# Patient Record
Sex: MALE | Race: WHITE
[De-identification: names, ages, dates, MRNs, and addresses within clinical notes are randomized per-mention and may not be internally consistent; named-entity substitution may affect disease eponyms.]

---

## 2020-09-08 ENCOUNTER — HOSPITAL ENCOUNTER (OUTPATIENT)
Dept: HOSPITAL 52 - LL.ED | Age: 45
Setting detail: OBSERVATION
Discharge: NURSING, REHAB OR LONG-TERM CARE | End: 2020-09-08
Attending: EMERGENCY MEDICINE | Admitting: EMERGENCY MEDICINE
Payer: COMMERCIAL

## 2020-09-08 VITALS — DIASTOLIC BLOOD PRESSURE: 89 MMHG | HEART RATE: 77 BPM | SYSTOLIC BLOOD PRESSURE: 133 MMHG

## 2020-09-08 DIAGNOSIS — F17.210: ICD-10-CM

## 2020-09-08 DIAGNOSIS — F41.9: ICD-10-CM

## 2020-09-08 DIAGNOSIS — K21.9: ICD-10-CM

## 2020-09-08 DIAGNOSIS — R78.0: ICD-10-CM

## 2020-09-08 DIAGNOSIS — R07.2: Primary | ICD-10-CM

## 2020-09-08 DIAGNOSIS — Z79.899: ICD-10-CM

## 2020-09-08 LAB
CHLORIDE SERPL-SCNC: 102 MMOL/L (ref 98–107)
SODIUM SERPL-SCNC: 139 MMOL/L (ref 136–145)

## 2020-09-08 PROCEDURE — C9113 INJ PANTOPRAZOLE SODIUM, VIA: HCPCS

## 2020-09-08 PROCEDURE — G0378 HOSPITAL OBSERVATION PER HR: HCPCS

## 2020-09-08 RX ADMIN — Medication PRN ML: at 14:37

## 2020-09-08 RX ADMIN — Medication PRN ML: at 14:41

## 2020-09-08 RX ADMIN — Medication PRN ML: at 16:41

## 2020-09-08 NOTE — [id number]
**Discharge Summary





- Hospital Course


Brief History: Admitted for observation and serial troponins/rule-out MI due to 

left sided chest pain.


Diagnosis: Stroke: No





- Discharge Data


Discharge Date: 09/08/20


Discharge Disposition: DC/Tfer to Acute Hospital 02


Condition: Good





- Referral to Home Health


Primary Care Physician: 


Shannan Terry PA-C








- Discharge Diagnosis/Problem(s)


(1) Chest pain


SNOMED Code(s): 26895722


   ICD Code: R07.9 - CHEST PAIN, UNSPECIFIED   Status: Acute   Priority: High   

Current Visit: Yes   Problem Details: Negative Troponins in ER and 1900 .  No 

acute changes on EKG in ER.  1900 EKG showed new nonspecific changes that were 

subsequently reviewed with  from [facility].  He also felt 

that changes were nonspecific but offered to take the patient for further 

evaluation with plans for stress test tomorrow.  Differential includes 

gastritis/GI etiology from ETOH use over the weekend and yesterday.    


Qualifiers: 


   Chest pain type: unspecified   Qualified Code(s): R07.9 - Chest pain, 

unspecified   





(2) GERD (gastroesophageal reflux disease)


SNOMED Code(s): 044998462


   ICD Code: K21.9 - GASTRO-ESOPHAGEAL REFLUX DISEASE WITHOUT ESOPHAGITIS   

Status: Chronic   Priority: High   Current Visit: Yes   Problem Details: History

of heartburn/GERD.  Takes Prevacid at home.  Received IV Protonix and Pepcid in 

ER.  Suspect heavy ETOH over holiday weekend may have triggered exacerbation. 

H.Pylori and stool for occult blood ordered but patient did not provide sample 

prior to transfer.    


Qualifiers: 


   Esophagitis presence: esophagitis presence not specified   Qualified Code(s):

K21.9 - Gastro-esophageal reflux disease without esophagitis   





(3) Elevated ETOH level


SNOMED Code(s): 687417198


   ICD Code: R78.0 - FINDING OF ALCOHOL IN BLOOD   Status: Acute   Priority: 

Medium   Current Visit: Yes   Problem Details: 0.07 ETOH level today in ER.  

Patient admits to drinking beer daily. LFTs normal. Encouraged to cut down and 

educated on effects of ETOH use on stomach/esophagus. Patient denies 

DTs/withdrawal symptoms when he does not drink.    


Qualifiers: 


   Blood alcohol level: level not specified   Qualified Code(s): R78.0 - Finding

of alcohol in blood   





(4) Anxiety


SNOMED Code(s): 75340404


   ICD Code: F41.9 - ANXIETY DISORDER, UNSPECIFIED   Status: Chronic   Priority:

Medium   Current Visit: Yes   Problem Details: Currently not under medical 

therapy.    





- Patient Summary/Data


Hospital Course: 





Patient continued to note pain present left lower anterior chest.  Improved with

the GI cocktail in ER.  Gradually came back.  Also noted continued intermittent 

stabbing feeling to the patient. Pressing on chest wall sometimes reproduced p

ain complaint.  Toradol did not appear to be helpful.  Patient did not want 

additional IV meds for the pain/Nitro but eventually was ok with having another 

GI cocktail. Second GI cocktail also improved pain complaint. PRN MS ordered.  

Patient refused additional IV fluids. 


Troponin at 1900  negative.  EKG showed nonspecific changes throughout.  Call 

placed to [facility] and patient discussed with .  He also 

felt that EKG changes were nonspecific.  He did offer to have patient 

transferred to their facility to complete the rule out and have stress testing 

performed tomorrow morning for a more complete rule out.  This was offered to 

the patient.  Patient and wife elected for transfer to [facility]. Patient 

officially accepted by , Hospitalist.  Patient to be transferred by 

EMS once bed confirmed to be available. 


Vital signs stable throughout stay.


Telemetry unremarkable overall. 





- Discharge Plan


Home Medications: 


                                    Home Meds





Fluticasone Propionate 1 sprays NASBOTH BEDTIME 09/08/20 [History]


Lansoprazole [Prevacid] 30 mg PO BID@1200,2000 09/08/20 [History]


Montelukast Sodium [Singulair] 10 mg PO BEDTIME 09/08/20 [History]








Forms:  ED Department Discharge


Referrals: 


Shannan Terry PA-C [Primary Care Provider] - 





- Discharge Summary/Plan Comment


DC Time >30 min.: Yes (Waiting for bed confirmation from [facility])





- Patient Data


Vitals - Most Recent: 


                                Last Vital Signs











Temp  37.7 C   09/08/20 20:00


 


Pulse  77   09/08/20 20:00


 


Resp  16   09/08/20 20:00


 


BP  133/89   09/08/20 20:00


 


Pulse Ox  97   09/08/20 20:00











Weight - Most Recent: 107.91 kg


Lab Results - Last 24 hrs: 


                         Laboratory Results - last 24 hr











  09/08/20 09/08/20 09/08/20 Range/Units





  14:25 14:25 14:25 


 


WBC  11.7 H    (4.0-10.2)  K/uL


 


RBC  5.75 H    (4.33-5.41)  M/uL


 


Hgb  17.8 H    (13.1-16.8)  g/dL


 


Hct  51.6 H    (39.0-49.0)  %


 


MCV  89.7    (84.0-98.0)  fL


 


MCH  31.0    (28.2-33.3)  pg


 


MCHC  34.5    (31.7-36.0)  g/dL


 


RDW  14.1    (11.2-14.1)  %


 


Plt Count  225    (150-350)  K/uL


 


Neut % (Auto)  75.9    (45.0-80.0)  %


 


Lymph % (Auto)  14.2    (10.0-50.0)  %


 


Mono % (Auto)  8.6    (2.0-14.0)  %


 


Eos % (Auto)  1.0    (0.0-5.0)  %


 


Baso % (Auto)  0.3    (0.0-2.0)  %


 


Neut # (Auto)  8.84 H    (1.40-7.00)  K/uL


 


Lymph # (Auto)  1.66    (0.50-3.50)  K/uL


 


Mono # (Auto)  1.00    (0.00-1.00)  K/uL


 


Eos # (Auto)  0.12    (0.00-0.50)  K/uL


 


Baso # (Auto)  0.04    (0.00-0.20)  K/uL


 


D-Dimer, Quantitative   183   (0-400)  ng/mL


 


Sodium    139  (136-145)  mmol/L


 


Potassium    3.8  (3.5-5.1)  mmol/L


 


Chloride    102  ()  mmol/L


 


Carbon Dioxide    23.8  (21.0-32.0)  mmol/L


 


BUN    11  (7-18)  mg/dL


 


Creatinine    1.01  (0.51-1.17)  mg/dL


 


Est Cr Clr Drug Dosing    TNP  


 


Estimated GFR (MDRD)    > 60  mL/min


 


Glucose    93  ()  mg/dL


 


Lactic Acid     (0.4-2.0)  mmol/L


 


Calcium    8.6  (8.5-10.1)  mg/dL


 


Magnesium    2.0  (1.8-2.4)  mg/dL


 


Total Bilirubin    0.5  (0.2-1.0)  mg/dL


 


AST    18  (15-37)  U/L


 


ALT    27  (12-78)  U/L


 


Alkaline Phosphatase    49  ()  IU/L


 


Troponin I    0.000  (0.000-0.056)  ng/mL


 


NT-Pro-B Natriuret Pep    21  (0-125)  pg/mL


 


Total Protein    8.1  (6.4-8.2)  g/dL


 


Albumin    4.4  (3.4-5.0)  g/dL


 


Amylase     ()  U/L


 


Lipase     ()  U/L


 


Specimen Type     


 


Urine Color     


 


Urine Appearance     


 


Urine pH     (5.0-9.0)  


 


Ur Specific Gravity     (1.005-1.030)  


 


Urine Protein     (NEGATIVE)  mg/dL


 


Urine Glucose (UA)     (NEGATIVE)  mg/dL


 


Urine Ketones     (NEGATIVE)  mg/dL


 


Urine Occult Blood     (NEGATIVE)  


 


Urine Nitrite     (NEGATIVE)  


 


Urine Bilirubin     (NEGATIVE)  


 


Urine Urobilinogen     (0.2-1.0)  E.U./dL


 


Ur Leukocyte Esterase     (NEGATIVE)  


 


U Hyaline Cast (Auto)     


 


Urine RBC     /HPF


 


Urine WBC     /HPF


 


Ur Epithelial Cells     /LPF


 


Urine Bacteria     (NONE TO FEW)  /HPF


 


Urine Mucus     (NEGATIVE)  /LPF


 


Ethyl Alcohol    0.071  (0.000-0.080)  g/dL














  09/08/20 09/08/20 09/08/20 Range/Units





  14:25 14:25 17:40 


 


WBC     (4.0-10.2)  K/uL


 


RBC     (4.33-5.41)  M/uL


 


Hgb     (13.1-16.8)  g/dL


 


Hct     (39.0-49.0)  %


 


MCV     (84.0-98.0)  fL


 


MCH     (28.2-33.3)  pg


 


MCHC     (31.7-36.0)  g/dL


 


RDW     (11.2-14.1)  %


 


Plt Count     (150-350)  K/uL


 


Neut % (Auto)     (45.0-80.0)  %


 


Lymph % (Auto)     (10.0-50.0)  %


 


Mono % (Auto)     (2.0-14.0)  %


 


Eos % (Auto)     (0.0-5.0)  %


 


Baso % (Auto)     (0.0-2.0)  %


 


Neut # (Auto)     (1.40-7.00)  K/uL


 


Lymph # (Auto)     (0.50-3.50)  K/uL


 


Mono # (Auto)     (0.00-1.00)  K/uL


 


Eos # (Auto)     (0.00-0.50)  K/uL


 


Baso # (Auto)     (0.00-0.20)  K/uL


 


D-Dimer, Quantitative     (0-400)  ng/mL


 


Sodium     (136-145)  mmol/L


 


Potassium     (3.5-5.1)  mmol/L


 


Chloride     ()  mmol/L


 


Carbon Dioxide     (21.0-32.0)  mmol/L


 


BUN     (7-18)  mg/dL


 


Creatinine     (0.51-1.17)  mg/dL


 


Est Cr Clr Drug Dosing     


 


Estimated GFR (MDRD)     mL/min


 


Glucose     ()  mg/dL


 


Lactic Acid  1.6    (0.4-2.0)  mmol/L


 


Calcium     (8.5-10.1)  mg/dL


 


Magnesium     (1.8-2.4)  mg/dL


 


Total Bilirubin     (0.2-1.0)  mg/dL


 


AST     (15-37)  U/L


 


ALT     (12-78)  U/L


 


Alkaline Phosphatase     ()  IU/L


 


Troponin I     (0.000-0.056)  ng/mL


 


NT-Pro-B Natriuret Pep     (0-125)  pg/mL


 


Total Protein     (6.4-8.2)  g/dL


 


Albumin     (3.4-5.0)  g/dL


 


Amylase   90   ()  U/L


 


Lipase   175   ()  U/L


 


Specimen Type    Urinblad  


 


Urine Color    Dark yellow  


 


Urine Appearance    Clear  


 


Urine pH    5.5  (5.0-9.0)  


 


Ur Specific Gravity    1.025  (1.005-1.030)  


 


Urine Protein    Negative  (NEGATIVE)  mg/dL


 


Urine Glucose (UA)    Negative  (NEGATIVE)  mg/dL


 


Urine Ketones    Trace H  (NEGATIVE)  mg/dL


 


Urine Occult Blood    Negative  (NEGATIVE)  


 


Urine Nitrite    Negative  (NEGATIVE)  


 


Urine Bilirubin    Negative  (NEGATIVE)  


 


Urine Urobilinogen    0.2  (0.2-1.0)  E.U./dL


 


Ur Leukocyte Esterase    Negative  (NEGATIVE)  


 


U Hyaline Cast (Auto)    Few  


 


Urine RBC    0-5  /HPF


 


Urine WBC    0-5  /HPF


 


Ur Epithelial Cells    Few  /LPF


 


Urine Bacteria    Rare  (NONE TO FEW)  /HPF


 


Urine Mucus    Moderate H  (NEGATIVE)  /LPF


 


Ethyl Alcohol     (0.000-0.080)  g/dL














  09/08/20 Range/Units





  19:05 


 


WBC   (4.0-10.2)  K/uL


 


RBC   (4.33-5.41)  M/uL


 


Hgb   (13.1-16.8)  g/dL


 


Hct   (39.0-49.0)  %


 


MCV   (84.0-98.0)  fL


 


MCH   (28.2-33.3)  pg


 


MCHC   (31.7-36.0)  g/dL


 


RDW   (11.2-14.1)  %


 


Plt Count   (150-350)  K/uL


 


Neut % (Auto)   (45.0-80.0)  %


 


Lymph % (Auto)   (10.0-50.0)  %


 


Mono % (Auto)   (2.0-14.0)  %


 


Eos % (Auto)   (0.0-5.0)  %


 


Baso % (Auto)   (0.0-2.0)  %


 


Neut # (Auto)   (1.40-7.00)  K/uL


 


Lymph # (Auto)   (0.50-3.50)  K/uL


 


Mono # (Auto)   (0.00-1.00)  K/uL


 


Eos # (Auto)   (0.00-0.50)  K/uL


 


Baso # (Auto)   (0.00-0.20)  K/uL


 


D-Dimer, Quantitative   (0-400)  ng/mL


 


Sodium   (136-145)  mmol/L


 


Potassium   (3.5-5.1)  mmol/L


 


Chloride   ()  mmol/L


 


Carbon Dioxide   (21.0-32.0)  mmol/L


 


BUN   (7-18)  mg/dL


 


Creatinine   (0.51-1.17)  mg/dL


 


Est Cr Clr Drug Dosing   


 


Estimated GFR (MDRD)   mL/min


 


Glucose   ()  mg/dL


 


Lactic Acid   (0.4-2.0)  mmol/L


 


Calcium   (8.5-10.1)  mg/dL


 


Magnesium   (1.8-2.4)  mg/dL


 


Total Bilirubin   (0.2-1.0)  mg/dL


 


AST   (15-37)  U/L


 


ALT   (12-78)  U/L


 


Alkaline Phosphatase   ()  IU/L


 


Troponin I  0.000  (0.000-0.056)  ng/mL


 


NT-Pro-B Natriuret Pep   (0-125)  pg/mL


 


Total Protein   (6.4-8.2)  g/dL


 


Albumin   (3.4-5.0)  g/dL


 


Amylase   ()  U/L


 


Lipase   ()  U/L


 


Specimen Type   


 


Urine Color   


 


Urine Appearance   


 


Urine pH   (5.0-9.0)  


 


Ur Specific Gravity   (1.005-1.030)  


 


Urine Protein   (NEGATIVE)  mg/dL


 


Urine Glucose (UA)   (NEGATIVE)  mg/dL


 


Urine Ketones   (NEGATIVE)  mg/dL


 


Urine Occult Blood   (NEGATIVE)  


 


Urine Nitrite   (NEGATIVE)  


 


Urine Bilirubin   (NEGATIVE)  


 


Urine Urobilinogen   (0.2-1.0)  E.U./dL


 


Ur Leukocyte Esterase   (NEGATIVE)  


 


U Hyaline Cast (Auto)   


 


Urine RBC   /HPF


 


Urine WBC   /HPF


 


Ur Epithelial Cells   /LPF


 


Urine Bacteria   (NONE TO FEW)  /HPF


 


Urine Mucus   (NEGATIVE)  /LPF


 


Ethyl Alcohol   (0.000-0.080)  g/dL











Med Orders - Current: 


                               Current Medications





Acetaminophen (Tylenol)  650 mg PO Q4H PRN


   PRN Reason: Pain (Mild 1-3)/fever


Diazepam (Valium.)  5 mg PO ONETIME ONE


   Stop: 09/08/20 22:01


Diphenhydramine HCl (Benadryl)  50 mg IVPUSH ONETIME ONE


   Stop: 09/08/20 22:01


Famotidine (Pepcid)  20 mg IVPUSH ONETIME ONE


   Stop: 09/09/20 07:01


Fluticasone Propionate (Flonase)  0 gm NASBOTH BEDTIME BURT


   Last Admin: 09/08/20 19:21 Dose:  1 applic


   Documented by: 


Ketorolac Tromethamine (Toradol)  30 mg IVPUSH Q6H PRN


   PRN Reason: Pain (moderate 4-6)


   Last Admin: 09/08/20 16:39 Dose:  30 mg


   Documented by: 


Morphine Sulfate (Morphine)  2 mg IVPUSH Q2H PRN


   PRN Reason: Pain


Ondansetron HCl (Zofran)  4 mg IVPUSH Q4H PRN


   PRN Reason: Nausea/Vomiting


Pantoprazole Sodium (Protonix Iv***)  40 mg IVPUSH ONETIME ONE


   Stop: 09/09/20 07:01


Sodium Chloride (Saline Flush)  10 ml FLUSH ASDIRECTED PRN


   PRN Reason: Keep Vein Open


   Last Admin: 09/08/20 16:41 Dose:  10 ml


   Documented by: 





Discontinued Medications





Al Hydroxide/Mg Hydroxide (Gi Cocktail)  30 ml PO ONETIME ONE


   Stop: 09/08/20 14:29


   Last Admin: 09/08/20 14:35 Dose:  30 ml


   Documented by: 


Al Hydroxide/Mg Hydroxide (Gi Cocktail)  30 ml PO ONETIME ONE


   Stop: 09/08/20 18:41


Famotidine (Pepcid)  20 mg IVPUSH ONETIME ONE


   Stop: 09/08/20 15:29


   Last Admin: 09/08/20 16:39 Dose:  20 mg


   Documented by: 


Sodium Chloride (Normal Saline)  1,000 mls @ 999 mls/hr IV .BOLUS ONE


   Stop: 09/08/20 15:48


   Last Admin: 09/08/20 14:49 Dose:  999 mls/hr


   Documented by: 


Lorazepam (Ativan)  0.5 mg IVPUSH ONETIME ONE


   Stop: 09/08/20 14:29


   Last Admin: 09/08/20 14:35 Dose:  0.5 mg


   Documented by: 


Morphine Sulfate (Morphine)  2 mg IVPUSH Q1H PRN


   PRN Reason: Pain


Nicotine (Habitrol)  21 mg TRDERM ONETIME ONE


   Stop: 09/08/20 17:33


   Last Admin: 09/08/20 18:33 Dose:  Not Given


   Documented by: 


Nicotine (Habitrol)  21 mg TRDERM ONETIME ONE


   Stop: 09/08/20 19:01


   Last Admin: 09/08/20 19:20 Dose:  21 mg


   Documented by: 


Ondansetron HCl (Zofran)  4 mg IVPUSH ONETIME ONE


   Stop: 09/08/20 14:29


   Last Admin: 09/08/20 14:41 Dose:  4 mg


   Documented by: 


Pantoprazole Sodium (Protonix Iv***)  40 mg IVPUSH ONETIME ONE


   Stop: 09/08/20 15:29


   Last Admin: 09/08/20 16:39 Dose:  40 mg


   Documented by:

## 2020-09-08 NOTE — EDM.PDOC
ED HPI GENERAL MEDICAL PROBLEM





- General


Chief Complaint: Chest Pain


Stated Complaint: Chest Pain


Time Seen by Provider: 09/08/20 14:12


Source of Information: Reports: Patient, Family


History Limitations: Reports: No Limitations





- History of Present Illness


INITIAL COMMENTS - FREE TEXT/NARRATIVE: 





Patient developed centralized chest pain at home shortly before coming to ER 

that was noted to be slightly to left of sternum.  Also had feeling of heaviness

that went across entire chest.  Mild nausea.  Pain worse with deep breath.  No 

change with change in position/lifting arm.  No history of previous similar pain

in past other than 30 min episode stabbing discomfort last night.  Denies 

cardiac history.  Denies family history of MI/cardiac disease. 


Felt fine earlier today and was having normal day prior to developing above 

complaints.  Left sided chest pain has intermittent sharp pattern.


Denies heavy lifting /injury recently. 


Has spaghetti and meatballs prior to developing pain around noon.  Also had an 

argument with his wife but both patient and wife say it was not that severe of 

an argument. 





No fevers/chills/infectious symptoms.


No HEENT changes such as HA/vision change/Ear pain/ST/swollen nodes/stiff neck


Resp negative for cough/wheeze/SOB


CV negative for palpitations/syncope


GI + for the nausea as noted above.  No emesis/bowel changes/abdominal pain


 negative for dysuria/UTI complaints/blood in urine.


Neuro negative for focal weakness or numbness.





Patient is a daily user of ETOH.  Often has a six pack of Bud over a day.  Did 

have a much larger quantity of beers yesterday due to the holiday.  Also had 

Rum. 


Has history of heartburn/reflux in past.  Also was given something for anxiety 

by a provider but never used it. 


Smokes 1/2PPD


No drug use. 





Was given ASA by EMS.  Also given Nitro--patient did not notice any improvement 

in chest pain with Nitro. 





- Related Data


                                    Allergies











Allergy/AdvReac Type Severity Reaction Status Date / Time


 


lorazepam [From Ativan] Allergy  Dizziness Verified 09/08/20 14:52











Home Meds: 


                                    Home Meds





. [No Known Home Meds]  06/19/15 [History]











Past Medical History


Respiratory History: Reports: Other (See Below) (Smoker)


Psychiatric History: Reports: Anxiety





Social & Family History





- Tobacco Use


Smoking Status *Q: Current Every Day Smoker


Packs/Tins Daily: 0.5


Smoking Cessation Information Provided To Patient: Patient Refused





- Caffeine Use


Caffeine Use: Reports: Soda (1-2 16ounce Cokes daily)





- Alcohol Use


Alcohol Use History: Yes


Days Per Week of Alcohol Use Comment: Per wife patient likely averages 6 Buds a 

day, sometimes a lot more, sometimes less.  Patient vague as to how much beer he

 drinks daily.


Alcohol Use in Last Twelve Months: Yes





- Recreational Drug Use


Recreational Drug Use: No


Drug Use in Last 12 Months: No





ED ROS GENERAL





- Review of Systems


Review Of Systems: Comprehensive ROS is negative, except as noted in HPI.





ED EXAM, GENERAL





- Physical Exam


Exam: See Below


Exam Limited By: No Limitations


General Appearance: WD/WN, Anxious, Mild Distress


Eye Exam: Bilateral Eye: EOMI, PERRL


Ears: Normal External Exam, Normal Canal, Hearing Grossly Normal


Nose: No: Nasal Deformity, Nasal Swelling, Nasal Drainage


Throat/Mouth: Normal Lips, Normal Voice, No Airway Compromise


Head: Atraumatic, Normocephalic


Neck: Normal Inspection, Supple, Non-Tender, Full Range of Motion


Respiratory/Chest: No Respiratory Distress, Normal Breath Sounds, No Accessory 

Muscle Use, Wheezing (minimal/bilateral), Other (No pain with palpation around 

sternum.  Did have some focal discomfort with palpation just inferior to left 

nipple that reproduced pain complaint. ).  No: Crackles, Rales, Rhonchi, 

Stridor, Pleural Rub, Accessory Muscle Use, Retractions


Cardiovascular: Regular Rate, Rhythm, No Edema, No Murmur


GI/Abdominal: Normal Bowel Sounds, Soft, Non-Tender, No Distention


 (Male) Exam: Deferred


Rectal (Males) Exam: Deferred


Back Exam: No: CVA Tenderness (L), CVA Tenderness (R), Muscle Spasm


Extremities: Normal Inspection, Normal Range of Motion, No Pedal Edema, Normal 

Capillary Refill


Neurological: Alert, Oriented, CN II-XII Intact, Normal Cognition, Normal Gait, 

No Motor/Sensory Deficits


Psychiatric: Normal Affect, Normal Mood


Skin Exam: Warm, Dry, Intact, Normal Color





EKG INTERPRETATION


EKG Date: 09/08/20


Time: 14:07


Rhythm: NSR


Rate (Beats/Min): 93


Axis: Normal


P-Wave: Present


QRS: Normal


ST-T: Normal


QT: Normal


Comparison: NA - No Prior EKG





Course





- Vital Signs


Last Recorded V/S: 





                                Last Vital Signs











Temp  36.4 C   09/08/20 14:10


 


Pulse  90   09/08/20 14:10


 


Resp  17   09/08/20 14:10


 


BP  121/72   09/08/20 14:10


 


Pulse Ox  94 L  09/08/20 14:10














- Orders/Labs/Meds


Orders: 





                               Active Orders 24 hr











 Category Date Time Status


 


 EKG Documentation Completion [RC] ASDIRECTED Care  09/08/20 14:27 Active


 


 Chest 2V [CR] Stat Exams  09/08/20 14:27 Taken


 


 UA W/MICROSCOPIC [URIN] Stat Lab  09/08/20 14:27 Ordered


 


 Sodium Chloride 0.9% [Saline Flush] Med  09/08/20 14:27 Active





 10 ml FLUSH ASDIRECTED PRN   


 


 Saline Lock Insert [OM.PC] Stat Oth  09/08/20 14:27 Ordered








                                Medication Orders





Acetaminophen (Tylenol)  650 mg PO Q4H PRN


   PRN Reason: Pain (Mild 1-3)/fever


Famotidine (Pepcid)  20 mg IVPUSH ONETIME ONE


   Stop: 09/09/20 07:01


Ketorolac Tromethamine (Toradol)  30 mg IVPUSH Q6H PRN


   PRN Reason: Pain (moderate 4-6)


Ondansetron HCl (Zofran)  4 mg IVPUSH Q4H PRN


   PRN Reason: Nausea/Vomiting


Pantoprazole Sodium (Protonix Iv***)  40 mg IVPUSH ONETIME ONE


   Stop: 09/09/20 07:01


Sodium Chloride (Saline Flush)  10 ml FLUSH ASDIRECTED PRN


   PRN Reason: Keep Vein Open


   Last Admin: 09/08/20 14:41  Dose: 10 ml


   Documented by: MANN


   Admin: 09/08/20 14:37  Dose: 10 ml


   Documented by: MANN








Labs: 





                                Laboratory Tests











  09/08/20 09/08/20 09/08/20 Range/Units





  14:25 14:25 14:25 


 


WBC  11.7 H    (4.0-10.2)  K/uL


 


RBC  5.75 H    (4.33-5.41)  M/uL


 


Hgb  17.8 H    (13.1-16.8)  g/dL


 


Hct  51.6 H    (39.0-49.0)  %


 


MCV  89.7    (84.0-98.0)  fL


 


MCH  31.0    (28.2-33.3)  pg


 


MCHC  34.5    (31.7-36.0)  g/dL


 


RDW  14.1    (11.2-14.1)  %


 


Plt Count  225    (150-350)  K/uL


 


Neut % (Auto)  75.9    (45.0-80.0)  %


 


Lymph % (Auto)  14.2    (10.0-50.0)  %


 


Mono % (Auto)  8.6    (2.0-14.0)  %


 


Eos % (Auto)  1.0    (0.0-5.0)  %


 


Baso % (Auto)  0.3    (0.0-2.0)  %


 


Neut # (Auto)  8.84 H    (1.40-7.00)  K/uL


 


Lymph # (Auto)  1.66    (0.50-3.50)  K/uL


 


Mono # (Auto)  1.00    (0.00-1.00)  K/uL


 


Eos # (Auto)  0.12    (0.00-0.50)  K/uL


 


Baso # (Auto)  0.04    (0.00-0.20)  K/uL


 


D-Dimer, Quantitative   183   (0-400)  ng/mL


 


Sodium    139  (136-145)  mmol/L


 


Potassium    3.8  (3.5-5.1)  mmol/L


 


Chloride    102  ()  mmol/L


 


Carbon Dioxide    23.8  (21.0-32.0)  mmol/L


 


BUN    11  (7-18)  mg/dL


 


Creatinine    1.01  (0.51-1.17)  mg/dL


 


Est Cr Clr Drug Dosing    TNP  


 


Estimated GFR (MDRD)    > 60  mL/min


 


Glucose    93  ()  mg/dL


 


Lactic Acid     (0.4-2.0)  mmol/L


 


Calcium    8.6  (8.5-10.1)  mg/dL


 


Magnesium    2.0  (1.8-2.4)  mg/dL


 


Total Bilirubin    0.5  (0.2-1.0)  mg/dL


 


AST    18  (15-37)  U/L


 


ALT    27  (12-78)  U/L


 


Alkaline Phosphatase    49  ()  IU/L


 


Troponin I    0.000  (0.000-0.056)  ng/mL


 


NT-Pro-B Natriuret Pep    21  (0-125)  pg/mL


 


Total Protein    8.1  (6.4-8.2)  g/dL


 


Albumin    4.4  (3.4-5.0)  g/dL


 


Ethyl Alcohol    0.071  (0.000-0.080)  g/dL














  09/08/20 Range/Units





  14:25 


 


WBC   (4.0-10.2)  K/uL


 


RBC   (4.33-5.41)  M/uL


 


Hgb   (13.1-16.8)  g/dL


 


Hct   (39.0-49.0)  %


 


MCV   (84.0-98.0)  fL


 


MCH   (28.2-33.3)  pg


 


MCHC   (31.7-36.0)  g/dL


 


RDW   (11.2-14.1)  %


 


Plt Count   (150-350)  K/uL


 


Neut % (Auto)   (45.0-80.0)  %


 


Lymph % (Auto)   (10.0-50.0)  %


 


Mono % (Auto)   (2.0-14.0)  %


 


Eos % (Auto)   (0.0-5.0)  %


 


Baso % (Auto)   (0.0-2.0)  %


 


Neut # (Auto)   (1.40-7.00)  K/uL


 


Lymph # (Auto)   (0.50-3.50)  K/uL


 


Mono # (Auto)   (0.00-1.00)  K/uL


 


Eos # (Auto)   (0.00-0.50)  K/uL


 


Baso # (Auto)   (0.00-0.20)  K/uL


 


D-Dimer, Quantitative   (0-400)  ng/mL


 


Sodium   (136-145)  mmol/L


 


Potassium   (3.5-5.1)  mmol/L


 


Chloride   ()  mmol/L


 


Carbon Dioxide   (21.0-32.0)  mmol/L


 


BUN   (7-18)  mg/dL


 


Creatinine   (0.51-1.17)  mg/dL


 


Est Cr Clr Drug Dosing   


 


Estimated GFR (MDRD)   mL/min


 


Glucose   ()  mg/dL


 


Lactic Acid  1.6  (0.4-2.0)  mmol/L


 


Calcium   (8.5-10.1)  mg/dL


 


Magnesium   (1.8-2.4)  mg/dL


 


Total Bilirubin   (0.2-1.0)  mg/dL


 


AST   (15-37)  U/L


 


ALT   (12-78)  U/L


 


Alkaline Phosphatase   ()  IU/L


 


Troponin I   (0.000-0.056)  ng/mL


 


NT-Pro-B Natriuret Pep   (0-125)  pg/mL


 


Total Protein   (6.4-8.2)  g/dL


 


Albumin   (3.4-5.0)  g/dL


 


Ethyl Alcohol   (0.000-0.080)  g/dL











Meds: 





Medications











Generic Name Dose Route Start Last Admin





  Trade Name Freq  PRN Reason Stop Dose Admin


 


Acetaminophen  650 mg  09/08/20 15:57 





  Tylenol  PO  





  Q4H PRN  





  Pain (Mild 1-3)/fever  


 


Famotidine  20 mg  09/09/20 07:00 





  Pepcid  IVPUSH  09/09/20 07:01 





  ONETIME ONE  


 


Ketorolac Tromethamine  30 mg  09/08/20 15:57 





  Toradol  IVPUSH  





  Q6H PRN  





  Pain (moderate 4-6)  


 


Ondansetron HCl  4 mg  09/08/20 15:57 





  Zofran  IVPUSH  





  Q4H PRN  





  Nausea/Vomiting  


 


Pantoprazole Sodium  40 mg  09/09/20 07:00 





  Protonix Iv***  IVPUSH  09/09/20 07:01 





  ONETIME ONE  


 


Sodium Chloride  10 ml  09/08/20 14:27  09/08/20 14:41





  Saline Flush  FLUSH   10 ml





  ASDIRECTED PRN   Administration





  Keep Vein Open  














Discontinued Medications














Generic Name Dose Route Start Last Admin





  Trade Name Gege  PRN Reason Stop Dose Admin


 


Al Hydroxide/Mg Hydroxide  30 ml  09/08/20 14:28  09/08/20 14:35





  Gi Cocktail  PO  09/08/20 14:29  30 ml





  ONETIME ONE   Administration


 


Famotidine  20 mg  09/08/20 15:28 





  Pepcid  IVPUSH  09/08/20 15:29 





  ONETIME ONE  


 


Sodium Chloride  1,000 mls @ 999 mls/hr  09/08/20 14:48  09/08/20 14:49





  Normal Saline  IV  09/08/20 15:48  999 mls/hr





  .BOLUS ONE   Administration


 


Lorazepam  0.5 mg  09/08/20 14:28  09/08/20 14:35





  Ativan  IVPUSH  09/08/20 14:29  0.5 mg





  ONETIME ONE   Administration


 


Ondansetron HCl  4 mg  09/08/20 14:28  09/08/20 14:41





  Zofran  IVPUSH  09/08/20 14:29  4 mg





  ONETIME ONE   Administration


 


Pantoprazole Sodium  40 mg  09/08/20 15:28 





  Protonix Iv***  IVPUSH  09/08/20 15:29 





  ONETIME ONE  














- Radiology Interpretation


Free Text/Narrative:: 





Chest xray shows no effusion/pneumothorax/focal consolidation.  Pending 

Radiology review.  Cardiac size appears slightly enlarged. 





- Re-Assessments/Exams


Free Text/Narrative Re-Assessment/Exam: 





09/08/20 16:18


Patient had no improvement with pain s/p Nitro given by EMS.  He did have 

significant improvement of pain after GI cocktail.  Eventually improvement wore 

off.  He did receive IV Protonix and Pepcid.  WBC mildly elevated.  No focal 

infection identified during history/evaluation.  Suspect demargination due to 

pain/stress/anxiety.  Elevated Hgb/Hct suspect due to smoking history and may 

have dehydration component from patient's use of ETOH yesterday.  


It was noted that patient's blood alcohol was 0.07 and this was reviewed with 

him.  He does not report drinking ETOH today.  


Troponin negative.  EKG showed no acute changes.  DDimer/Lactic acid/ProBNP 

normal. 


LFTs and Mg normal. 





Suspect chest pain complaint may be caused by GI etiology given patient's 

history of GERD/heavy use of ETOH yesterday/improvement of symptoms after GI 

cocktail. 


Cannot rule out musc/skel component given partial reproducibility by chest 

palpation.  


No focal lung changed noted on xray/pending RAdiology review.  


Will admit observation to more thoroughly rule out cardiac contribution/MI.  

Telemetry/repeat Troponins/EKG ordered. 





Patient was given single dose of Ativan early in stay due to high anxiety levels

 about chest pain/IVs/blood draws.  He did not like how the Ativan made him 

feel.  He described feeling jumpy, and had some muscle twitching.  Medication 

was added to his list of allergies. 


Zofran given for nausea.  








Departure





- Departure


Time of Disposition: 15:45


Disposition: Refer to Observation


Condition: Good


Clinical Impression: 


Chest pain


Qualifiers:


 Chest pain type: unspecified Qualified Code(s): R07.9 - Chest pain, unspecified








- Discharge Information





Sepsis Event Note (ED)





- Evaluation


Sepsis Screening Result: No Definite Risk





- Focused Exam


Vital Signs: 





                                   Vital Signs











  Temp Pulse Resp BP Pulse Ox


 


 09/08/20 14:10  36.4 C  90  17  121/72  94 L














- Problem List & Annotations


(1) Chest pain


SNOMED Code(s): 83850954


   Code(s): R07.9 - CHEST PAIN, UNSPECIFIED   Status: Acute   Priority: High   

Current Visit: Yes   Annotation/Comment:: Negative initial Troponin.  No acute 

changes on EKG.  Current history/pattern more suggestive of GI etiology.  Will 

continue telemetry and repeat Troponins at 2000 tonight and in the morning.     


Qualifiers: 


   Chest pain type: unspecified   Qualified Code(s): R07.9 - Chest pain, 

unspecified   





(2) Anxiety


SNOMED Code(s): 17518031


   Code(s): F41.9 - ANXIETY DISORDER, UNSPECIFIED   Status: Chronic   Priority: 

Medium   Current Visit: Yes   Annotation/Comment:: Currently not under medical 

therapy.    





(3) Elevated ETOH level


SNOMED Code(s): 502493854


   Code(s): R78.0 - FINDING OF ALCOHOL IN BLOOD   Status: Acute   Priority: 

Medium   Current Visit: Yes   Annotation/Comment:: 0.07 ETOH level today.  

Patient admits to drinking beer daily.  Encouraged to cut down and educated on 

effects of ETOH use on stomach/esophagus. Patient denies DTs/withdrawal symptoms

 when he does not drink.    


Qualifiers: 


   Blood alcohol level: level not specified   Qualified Code(s): R78.0 - Finding

 of alcohol in blood   





(4) GERD (gastroesophageal reflux disease)


SNOMED Code(s): 401776738


   Code(s): K21.9 - GASTRO-ESOPHAGEAL REFLUX DISEASE WITHOUT ESOPHAGITIS   

Status: Chronic   Priority: High   Current Visit: Yes   Annotation/Comment:: 

History of heartburn/GERD.  Takes Prevacid at home.  Received IV Protonix and 

Pepcid in ER.  Suspect heavy ETOH over holiday weekend may have triggered 

exacerbation.    


Qualifiers: 


   Esophagitis presence: esophagitis presence not specified   Qualified Code(s):

 K21.9 - Gastro-esophageal reflux disease without esophagitis   





- Problem List Review


Problem List Initiated/Reviewed/Updated: Yes





- My Orders


Last 24 Hours: 





My Active Orders





09/08/20 14:27


EKG Documentation Completion [RC] ASDIRECTED 


Chest 2V [CR] Stat 


UA W/MICROSCOPIC [URIN] Stat 


Sodium Chloride 0.9% [Saline Flush]   10 ml FLUSH ASDIRECTED PRN 


Saline Lock Insert [OM.PC] Stat 














- Assessment/Plan


Admission H&P: Please use this note as an admission H&P


Last 24 Hours: 





My Active Orders





09/08/20 14:27


EKG Documentation Completion [RC] ASDIRECTED 


Chest 2V [CR] Stat 


UA W/MICROSCOPIC [URIN] Stat 


Sodium Chloride 0.9% [Saline Flush]   10 ml FLUSH ASDIRECTED PRN 


Saline Lock Insert [OM.PC] Stat 











Assessment:: 





as above.  Stable and suitable for general supervision. 


Plan: 





as above. Anticipate discharge home tomorrow if pain improves and has 

unremarkable stay/labs.

## 2021-06-01 ENCOUNTER — HOSPITAL ENCOUNTER (EMERGENCY)
Dept: HOSPITAL 52 - LL.ED | Age: 46
Discharge: HOME | End: 2021-06-01
Payer: COMMERCIAL

## 2021-06-01 VITALS — HEART RATE: 57 BPM | SYSTOLIC BLOOD PRESSURE: 135 MMHG | DIASTOLIC BLOOD PRESSURE: 94 MMHG

## 2021-06-01 DIAGNOSIS — R42: Primary | ICD-10-CM

## 2021-06-01 DIAGNOSIS — Z72.0: ICD-10-CM

## 2021-06-01 LAB
CHLORIDE SERPL-SCNC: 101 MMOL/L (ref 98–107)
SODIUM SERPL-SCNC: 136 MMOL/L (ref 136–145)

## 2021-06-01 NOTE — EDM.PDOC
ED HPI GENERAL MEDICAL PROBLEM





- General


Chief Complaint: General


Stated Complaint: tingling in all extremities post COVID vaccine


Time Seen by Provider: 06/01/21 11:46


Source of Information: Reports: Patient


History Limitations: Reports: No Limitations





- History of Present Illness


INITIAL COMMENTS - FREE TEXT/NARRATIVE: 





Patient comes to ER complaining of lightheadedness/peripheral tingling/feeling 

like he is going to pass out within 30 min of receiving covid vaccine.  Was 

feeling fine prior to this. 





- Related Data


                                    Allergies











Allergy/AdvReac Type Severity Reaction Status Date / Time


 


lorazepam [From Ativan] Allergy  Dizziness Verified 06/01/21 12:02











Home Meds: 


                                    Home Meds





Fluticasone Propionate 1 sprays NASBOTH BEDTIME 09/08/20 [History]


Lansoprazole [Prevacid] 30 mg PO BID@1200,2000 09/08/20 [History]


Montelukast Sodium [Singulair] 10 mg PO BEDTIME 09/08/20 [History]


Sertraline HCl 50 mg PO DAILY 06/01/21 [History]


busPIRone HCl [Buspirone HCl] 7.5 mg PO ASDIRECTED 06/01/21 [History]











Past Medical History


Respiratory History: Reports: Other (See Below) (Smoker)


Psychiatric History: Reports: Anxiety





Social & Family History





- Tobacco Use


Tobacco Use Status *Q: Current Every Day Tobacco User


Years of Tobacco use: 20


Packs/Tins Daily: 0.7


Month/Year Tobacco Last Used: 6-1-2021


Second Hand Smoke Exposure: Yes





- Caffeine Use


Caffeine Use: Reports: Soda


Caffeine Use Comment: pop - probably 3-5 can per day





- Alcohol Use


Days Per Week of Alcohol Use: 0





- Recreational Drug Use


Recreational Drug Use: No





ED ROS GENERAL





- Review of Systems


Review Of Systems: See Below


Constitutional: Reports: No Symptoms


HEENT: Denies: Rhinitis, Sinus Problem, Throat Pain, Throat Swelling, Vision 

Change


Respiratory: Reports: No Symptoms


Cardiovascular: Reports: Lightheadedness.  Denies: Chest Pain, Dyspnea on 

Exertion, Orthopnea, Palpitations


GI/Abdominal: Reports: No Symptoms


: Reports: No Symptoms


Musculoskeletal: Reports: No Symptoms


Skin: Reports: No Symptoms.  Denies: Pruritis, Rash, Erythema


Neurological: Reports: Other (tingling in arms/legs)


Psychiatric: Reports: Anxiety


Hematologic/Lymphatic: Reports: No Symptoms


Immunologic: Reports: No Symptoms





ED EXAM, GENERAL





- Physical Exam


Exam: See Below


Exam Limited By: No Limitations


General Appearance: Alert, No Apparent Distress, Anxious


Eye Exam: Bilateral Eye: EOMI, PERRL


Ears: Hearing Grossly Normal


Nose: No: Nasal Deformity, Nasal Swelling, Nasal Drainage


Throat/Mouth: Normal Lips, Normal Voice, No Airway Compromise


Head: Atraumatic, Normocephalic.  No: Facial Swelling


Neck: Normal Inspection, Supple, Non-Tender, Full Range of Motion


Respiratory/Chest: No Respiratory Distress, Lungs Clear, Normal Breath Sounds, 

No Accessory Muscle Use


Cardiovascular: Regular Rate, Rhythm, No Murmur


GI/Abdominal: Normal Bowel Sounds, Soft, Non-Tender, No Distention


 (Male) Exam: Deferred


Rectal (Males) Exam: Deferred


Back Exam: Normal Inspection


Extremities: Normal Inspection, Normal Range of Motion, Non-Tender, No Pedal 

Edema, Normal Capillary Refill


Neurological: Alert, Oriented, CN II-XII Intact, Normal Cognition, Normal Gait, 

No Motor/Sensory Deficits


Psychiatric: Anxious


Skin Exam: Warm, Dry, Intact, Normal Color


  ** #1 Interpretation


EKG Date: 06/01/21


Time: 12:05


Rhythm: Other (Sinus Bradycardia)


Rate (Beats/Min): 53


Axis: Normal


P-Wave: Present


QRS: Normal


ST-T: Normal


QT: Normal





Course





- Vital Signs


Last Recorded V/S: 


                                Last Vital Signs











Temp  35.9 C L  06/01/21 11:48


 


Pulse  63   06/01/21 11:48


 


Resp  16   06/01/21 11:48


 


BP  133/84   06/01/21 11:48


 


Pulse Ox  100   06/01/21 11:48














- Orders/Labs/Meds


Orders: 


                               Active Orders 24 hr











 Category Date Time Status


 


 EKG Documentation Completion [RC] ASDIRECTED Care  06/01/21 12:04 Active











Labs: 


                                Laboratory Tests











  06/01/21 06/01/21 Range/Units





  12:15 12:15 


 


WBC  5.6   (4.0-10.2)  K/uL


 


RBC  5.28   (4.33-5.41)  M/uL


 


Hgb  16.3  D   (13.1-16.8)  g/dL


 


Hct  46.8   (39.0-49.0)  %


 


MCV  88.6   (84.0-98.0)  fL


 


MCH  30.9   (28.2-33.3)  pg


 


MCHC  34.8   (31.7-36.0)  g/dL


 


RDW  12.9   (11.2-14.1)  %


 


Plt Count  195   (150-350)  K/uL


 


Neut % (Auto)  59.5   (45.0-80.0)  %


 


Lymph % (Auto)  26.1   (10.0-50.0)  %


 


Mono % (Auto)  11.6   (2.0-14.0)  %


 


Eos % (Auto)  2.1   (0.0-5.0)  %


 


Baso % (Auto)  0.7   (0.0-2.0)  %


 


Neut # (Auto)  3.33   (1.40-7.00)  K/uL


 


Lymph # (Auto)  1.46   (0.50-3.50)  K/uL


 


Mono # (Auto)  0.65   (0.00-1.00)  K/uL


 


Eos # (Auto)  0.12   (0.00-0.50)  K/uL


 


Baso # (Auto)  0.04   (0.00-0.20)  K/uL


 


Sodium   136  (136-145)  mmol/L


 


Potassium   3.9  (3.5-5.1)  mmol/L


 


Chloride   101  ()  mmol/L


 


Carbon Dioxide   23.8  (21.0-32.0)  mmol/L


 


BUN   19 H  (7-18)  mg/dL


 


Creatinine   0.95  (0.51-1.17)  mg/dL


 


Est Cr Clr Drug Dosing   115.37  mL/min


 


Estimated GFR (MDRD)   > 60  mL/min


 


Glucose   91  (70-99)  mg/dL


 


Calcium   8.9  (8.5-10.1)  mg/dL


 


Magnesium   1.8  (1.8-2.4)  mg/dL


 


Total Bilirubin   0.7  (0.2-1.0)  mg/dL


 


AST   12 L  (15-37)  U/L


 


ALT   24  (12-78)  U/L


 


Alkaline Phosphatase   60  ()  IU/L


 


Total Protein   7.4  (6.4-8.2)  g/dL


 


Albumin   4.3  (3.4-5.0)  g/dL














- Re-Assessments/Exams


Free Text/Narrative Re-Assessment/Exam: 





06/01/21 12:31


Patient will be observed in ER.  No focal findings on exam. Baseline labs drawn.

 Suspect anxiety may be causing patient's complaints. 


Free Text/Narrative Re-Assessment/Exam: 





06/01/21 14:19


Patient observed for several hours.  Labs normal.  Symptoms improved over that 

time frame.  Was not consistent with an allergic reaction.  May have been 

stress/anxiety response given that symptoms started almost immediately after he 

received the injection. Cannot rule out other etiology however. Encouraged 

patient to report it to the vaccine adverse reaction website.  Precautions 

reviewed. To observe for additional changes and follow up as needed if any new 

problems/concerns develop. 





Departure





- Departure


Time of Disposition: 13:57


Disposition: Home, Self-Care 01


Condition: Good


Clinical Impression: 


 Does not feel right








- Discharge Information


*PRESCRIPTION DRUG MONITORING PROGRAM REVIEWED*: Not Applicable


*COPY OF PRESCRIPTION DRUG MONITORING REPORT IN PATIENT KATE: Not Applicable


Referrals: 


Raya Thomas NP [Primary Care Provider] - 


Forms:  ED Department Discharge


Additional Instructions: 


Take it easy today. See how you feel and if there are any additional changes. 

Stay hydrated.  Return if you start to have additional changes/problems. 





Sepsis Event Note (ED)





- Evaluation


Sepsis Screening Result: No Definite Risk





- Focused Exam


Vital Signs: 


                                   Vital Signs











  Temp Pulse Resp BP Pulse Ox


 


 06/01/21 11:48  35.9 C L  63  16  133/84  100














- My Orders


Last 24 Hours: 


My Active Orders





06/01/21 12:04


EKG Documentation Completion [RC] ASDIRECTED 














- Assessment/Plan


Last 24 Hours: 


My Active Orders





06/01/21 12:04


EKG Documentation Completion [RC] ASDIRECTED